# Patient Record
Sex: FEMALE | Race: BLACK OR AFRICAN AMERICAN | NOT HISPANIC OR LATINO | URBAN - METROPOLITAN AREA
[De-identification: names, ages, dates, MRNs, and addresses within clinical notes are randomized per-mention and may not be internally consistent; named-entity substitution may affect disease eponyms.]

---

## 2020-03-10 ENCOUNTER — EMERGENCY (EMERGENCY)
Facility: HOSPITAL | Age: 50
LOS: 1 days | Discharge: HOME | End: 2020-03-10
Admitting: EMERGENCY MEDICINE
Payer: MEDICAID

## 2020-03-10 VITALS
DIASTOLIC BLOOD PRESSURE: 80 MMHG | HEART RATE: 92 BPM | HEIGHT: 66 IN | SYSTOLIC BLOOD PRESSURE: 127 MMHG | OXYGEN SATURATION: 100 % | WEIGHT: 138.89 LBS | TEMPERATURE: 99 F | RESPIRATION RATE: 17 BRPM

## 2020-03-10 VITALS
DIASTOLIC BLOOD PRESSURE: 83 MMHG | HEART RATE: 98 BPM | OXYGEN SATURATION: 96 % | TEMPERATURE: 99 F | SYSTOLIC BLOOD PRESSURE: 145 MMHG | RESPIRATION RATE: 16 BRPM

## 2020-03-10 LAB
ALBUMIN SERPL ELPH-MCNC: 3.1 G/DL — LOW (ref 3.4–5)
ALP SERPL-CCNC: 71 U/L — SIGNIFICANT CHANGE UP (ref 40–120)
ALT FLD-CCNC: 41 U/L — SIGNIFICANT CHANGE UP (ref 12–42)
ANION GAP SERPL CALC-SCNC: 6 MMOL/L — LOW (ref 9–16)
AST SERPL-CCNC: 41 U/L — HIGH (ref 15–37)
BASOPHILS # BLD AUTO: 0.02 K/UL — SIGNIFICANT CHANGE UP (ref 0–0.2)
BASOPHILS NFR BLD AUTO: 0.5 % — SIGNIFICANT CHANGE UP (ref 0–2)
BILIRUB SERPL-MCNC: 0.6 MG/DL — SIGNIFICANT CHANGE UP (ref 0.2–1.2)
BUN SERPL-MCNC: 17 MG/DL — SIGNIFICANT CHANGE UP (ref 7–23)
CALCIUM SERPL-MCNC: 8.6 MG/DL — SIGNIFICANT CHANGE UP (ref 8.5–10.5)
CHLORIDE SERPL-SCNC: 106 MMOL/L — SIGNIFICANT CHANGE UP (ref 96–108)
CO2 SERPL-SCNC: 27 MMOL/L — SIGNIFICANT CHANGE UP (ref 22–31)
CREAT SERPL-MCNC: 0.74 MG/DL — SIGNIFICANT CHANGE UP (ref 0.5–1.3)
EOSINOPHIL # BLD AUTO: 0.04 K/UL — SIGNIFICANT CHANGE UP (ref 0–0.5)
EOSINOPHIL NFR BLD AUTO: 1 % — SIGNIFICANT CHANGE UP (ref 0–6)
GLUCOSE SERPL-MCNC: 97 MG/DL — SIGNIFICANT CHANGE UP (ref 70–99)
HCT VFR BLD CALC: SIGNIFICANT CHANGE UP % (ref 34.5–45)
HGB BLD-MCNC: 6.5 G/DL — CRITICAL LOW (ref 11.5–15.5)
IMM GRANULOCYTES NFR BLD AUTO: 0.7 % — SIGNIFICANT CHANGE UP (ref 0–1.5)
LYMPHOCYTES # BLD AUTO: 0.55 K/UL — LOW (ref 1–3.3)
LYMPHOCYTES # BLD AUTO: 13.4 % — SIGNIFICANT CHANGE UP (ref 13–44)
MCHC RBC-ENTMCNC: SIGNIFICANT CHANGE UP GM/DL (ref 32–36)
MCHC RBC-ENTMCNC: SIGNIFICANT CHANGE UP PG (ref 27–34)
MCV RBC AUTO: SIGNIFICANT CHANGE UP FL (ref 80–100)
MONOCYTES # BLD AUTO: 0.28 K/UL — SIGNIFICANT CHANGE UP (ref 0–0.9)
MONOCYTES NFR BLD AUTO: 6.8 % — SIGNIFICANT CHANGE UP (ref 2–14)
NEUTROPHILS # BLD AUTO: 3.19 K/UL — SIGNIFICANT CHANGE UP (ref 1.8–7.4)
NEUTROPHILS NFR BLD AUTO: 77.6 % — HIGH (ref 43–77)
NRBC # BLD: 0 /100 WBCS — SIGNIFICANT CHANGE UP (ref 0–0)
PLATELET # BLD AUTO: 208 K/UL — SIGNIFICANT CHANGE UP (ref 150–400)
POTASSIUM SERPL-MCNC: 3.7 MMOL/L — SIGNIFICANT CHANGE UP (ref 3.5–5.3)
POTASSIUM SERPL-SCNC: 3.7 MMOL/L — SIGNIFICANT CHANGE UP (ref 3.5–5.3)
PROT SERPL-MCNC: 6.7 G/DL — SIGNIFICANT CHANGE UP (ref 6.4–8.2)
RBC # BLD: 3.21 M/UL — LOW (ref 3.8–5.2)
RBC # FLD: SIGNIFICANT CHANGE UP % (ref 10.3–14.5)
SODIUM SERPL-SCNC: 139 MMOL/L — SIGNIFICANT CHANGE UP (ref 132–145)
WBC # BLD: 4.11 K/UL — SIGNIFICANT CHANGE UP (ref 3.8–10.5)
WBC # FLD AUTO: 4.11 K/UL — SIGNIFICANT CHANGE UP (ref 3.8–10.5)

## 2020-03-10 PROCEDURE — 71046 X-RAY EXAM CHEST 2 VIEWS: CPT | Mod: 26

## 2020-03-10 PROCEDURE — 99284 EMERGENCY DEPT VISIT MOD MDM: CPT | Mod: 25

## 2020-03-10 NOTE — ED PROVIDER NOTE - DIAGNOSTIC INTERPRETATION
Interpreted by ED physician  Chest x-ray, 2 views  Lungs clear, heart shadow normal, bony structures normal, no free air under diaphragm, no PTX Interpreted by ED PA  Chest x-ray, 2 views  Lungs clear, heart shadow normal, bony structures normal, no free air under diaphragm, no PTX

## 2020-03-10 NOTE — ED PROVIDER NOTE - OBJECTIVE STATEMENT
48 y/o F with HIV undetectable viral load on Triumeq presents to the ED with mmc. Pt endorses tingling to toes, mild abdominal discomfort, and dry cough. Denies nausea, vomiting, changes in bowel movements, chest pain, shortness of breath, fevers, chills, or weakness. No recent travel.

## 2020-03-10 NOTE — ED PROVIDER NOTE - CLINICAL SUMMARY MEDICAL DECISION MAKING FREE TEXT BOX
50 y/o F with hx of HIV, undetectable viral load, presents to the ED w/ mmc. Pt endorses tingling to toes, mild abdominal discomfort, and dry cough. Pt is a poor historian and appears to be undomiciled. Given age and risk factors will do basic labs, CXR, and reassess. Suspect viral syndrome. Pt might be malingering?, will reassess. 50 y/o F with hx of HIV, undetectable viral load, presents to the ED w/ mmc. Pt endorses tingling to toes, mild abdominal discomfort, and dry cough. Pt is a poor historian and appears to be undomiciled. Given age and risk factors will do basic labs, CXR, and reassess. Suspect viral syndrome. Pt might be malingering?, will reassess. Note : pt. hemoglobin 6.5, as per pt. base line 6.0, pt is followed by her PCP should be on iron pills, no h/o tranfusions. pt. is asymtomtic, no SOB, no dizziness, VSS, encourage to take iron pills, f/u with PMD.

## 2020-03-10 NOTE — ED PROVIDER NOTE - NSFOLLOWUPINSTRUCTIONS_ED_ALL_ED_FT
Anemia     Anemia is a condition in which you do not have enough red blood cells or hemoglobin. Hemoglobin is a substance in red blood cells that carries oxygen. When you do not have enough red blood cells or hemoglobin (are anemic), your body cannot get enough oxygen and your organs may not work properly. As a result, you may feel very tired or have other problems.  What are the causes?  Common causes of anemia include:  Excessive bleeding. Anemia can be caused by excessive bleeding inside or outside the body, including bleeding from the intestine or from periods in women.Poor nutrition.Long-lasting (chronic) kidney, thyroid, and liver disease.Bone marrow disorders.Cancer and treatments for cancer.HIV (human immunodeficiency virus) and AIDS (acquired immunodeficiency syndrome).Treatments for HIV and AIDS.Spleen problems.Blood disorders.Infections, medicines, and autoimmune disorders that destroy red blood cells.What are the signs or symptoms?  Symptoms of this condition include:  Minor weakness.Dizziness.Headache.Feeling heartbeats that are irregular or faster than normal (palpitations).Shortness of breath, especially with exercise.Paleness.Cold sensitivity.Indigestion.Nausea.Difficulty sleeping.Difficulty concentrating.Symptoms may occur suddenly or develop slowly. If your anemia is mild, you may not have symptoms.  How is this diagnosed?  This condition is diagnosed based on:  Blood tests.Your medical history.A physical exam.Bone marrow biopsy.Your health care provider may also check your stool (feces) for blood and may do additional testing to look for the cause of your bleeding.  You may also have other tests, including:  Imaging tests, such as a CT scan or MRI.Endoscopy.Colonoscopy.How is this treated?  Treatment for this condition depends on the cause. If you continue to lose a lot of blood, you may need to be treated at a hospital. Treatment may include:  Taking supplements of iron, vitamin B12, or folic acid.Taking a hormone medicine (erythropoietin) that can help to stimulate red blood cell growth.Having a blood transfusion. This may be needed if you lose a lot of blood.Making changes to your diet.Having surgery to remove your spleen.Follow these instructions at home:  Take over-the-counter and prescription medicines only as told by your health care provider.Take supplements only as told by your health care provider.Follow any diet instructions that you were given.Keep all follow-up visits as told by your health care provider. This is important.Contact a health care provider if:  You develop new bleeding anywhere in the body.Get help right away if:  You are very weak.You are short of breath.You have pain in your abdomen or chest.You are dizzy or feel faint.You have trouble concentrating.You have bloody or black, tarry stools.You vomit repeatedly or you vomit up blood.Summary  Anemia is a condition in which you do not have enough red blood cells or enough of a substance in your red blood cells that carries oxygen (hemoglobin).Symptoms may occur suddenly or develop slowly.If your anemia is mild, you may not have symptoms.This condition is diagnosed with blood tests as well as a medical history and physical exam. Other tests may be needed.Treatment for this condition depends on the cause of the anemia.This information is not intended to replace advice given to you by your health care provider. Make sure you discuss any questions you have with your health care provider.

## 2020-03-10 NOTE — ED PROVIDER NOTE - PATIENT PORTAL LINK FT
You can access the FollowMyHealth Patient Portal offered by [facility] by registering at the following website: [email or web address]. By joining [employer]’s FollowMyHealth portal, you will also be able to view your health information using other applications (apps) compatible with our system.

## 2020-03-14 DIAGNOSIS — R10.84 GENERALIZED ABDOMINAL PAIN: ICD-10-CM

## 2020-03-14 DIAGNOSIS — R20.2 PARESTHESIA OF SKIN: ICD-10-CM

## 2020-03-14 DIAGNOSIS — D64.9 ANEMIA, UNSPECIFIED: ICD-10-CM

## 2020-03-14 DIAGNOSIS — R10.9 UNSPECIFIED ABDOMINAL PAIN: ICD-10-CM

## 2020-03-14 DIAGNOSIS — R05 COUGH: ICD-10-CM

## 2024-06-11 NOTE — ED ADULT TRIAGE NOTE - NSWEIGHTCALCTOOLDRUG_GEN_A_CORE
NO-SHOW LETTER MAILED TO Isael Resendiz.  ADDRESS: [address] [id number]  
 used